# Patient Record
Sex: MALE | Race: BLACK OR AFRICAN AMERICAN | NOT HISPANIC OR LATINO | URBAN - METROPOLITAN AREA
[De-identification: names, ages, dates, MRNs, and addresses within clinical notes are randomized per-mention and may not be internally consistent; named-entity substitution may affect disease eponyms.]

---

## 2017-08-29 ENCOUNTER — EMERGENCY (EMERGENCY)
Facility: HOSPITAL | Age: 32
LOS: 1 days | Discharge: HOME | End: 2017-08-29
Attending: EMERGENCY MEDICINE | Admitting: EMERGENCY MEDICINE
Payer: MEDICAID

## 2017-08-29 VITALS
TEMPERATURE: 98 F | DIASTOLIC BLOOD PRESSURE: 99 MMHG | HEIGHT: 68 IN | OXYGEN SATURATION: 98 % | HEART RATE: 79 BPM | RESPIRATION RATE: 16 BRPM | SYSTOLIC BLOOD PRESSURE: 141 MMHG | WEIGHT: 136.91 LBS

## 2017-08-29 DIAGNOSIS — A63.0 ANOGENITAL (VENEREAL) WARTS: ICD-10-CM

## 2017-08-29 DIAGNOSIS — L02.213 CUTANEOUS ABSCESS OF CHEST WALL: ICD-10-CM

## 2017-08-29 PROCEDURE — 10061 I&D ABSCESS COMP/MULTIPLE: CPT

## 2017-08-29 PROCEDURE — 99284 EMERGENCY DEPT VISIT MOD MDM: CPT | Mod: 25

## 2017-08-29 NOTE — ED PROVIDER NOTE - OBJECTIVE STATEMENT
32 yo M w/ no pertinent PMHx presents with painful abscess to sternum x 2 days. Pt notes itching to area that developed into a small boil. no bleeding, no chills, no fever, no purulent drainage, no red streaks. 32 yo M w/ history of HIV presents with painful abscess to sternum x 2 days. Pt notes itching to area that developed into a small boil. no bleeding, no chills, no fever, no purulent drainage, no red streaks. 32 yo M w/ history of HIV presents with painful abscess to lower sternum x 2 days. Pt notes itching to area that developed into a small boil. no bleeding, no chills, no fever, no purulent drainage, no red streaks.    Additionally pt requests rectal exam because he notes a "white bump" to the 12 o'clock position that has been there for "over a year."  Pt is sexually active with male partners, anal receptive.

## 2017-08-29 NOTE — ED ADULT TRIAGE NOTE - CHIEF COMPLAINT QUOTE
tearful and worried c/o 1.5 cm abscess to sternum. +redness no fever no red streaks, noticed 2days PTA.

## 2017-08-29 NOTE — ED ADULT NURSE NOTE - OBJECTIVE STATEMENT
Patient to ED with complaint of pain (possible abscess) to sternum.  Patient denies fever.  No acute distress

## 2017-08-29 NOTE — ED PROVIDER NOTE - MEDICAL DECISION MAKING DETAILS
pt with fluctuant tender abscess on exam, s/p I&D at bedside, packed with sterile packing strips and dressed with DSD, wound care instructions provided, instructed to return in 2d for wound check or sooner for any s/s of worsening infxn. strict return precautions discussed, pt verbalized understanding.

## 2017-08-29 NOTE — ED PROVIDER NOTE - SKIN, MLM
1.5 cm abscess to sternum, no surrounding erythema or swelling, no purulent drainage. 1.5 cm abscess to sternum, no surrounding erythema or cellulitis.  +Purulent head, no drainage.

## 2017-08-29 NOTE — ED PROVIDER NOTE - DIAGNOSIS COUNSELING, MDM
I had a detailed discussion with the patient regarding the historical points, exam findings, and any diagnostic results supporting the discharge diagnosis. conducted a detailed discussion...

## 2019-01-07 ENCOUNTER — EMERGENCY (EMERGENCY)
Facility: HOSPITAL | Age: 34
LOS: 1 days | Discharge: HOME | End: 2019-01-07
Attending: EMERGENCY MEDICINE | Admitting: EMERGENCY MEDICINE
Payer: MEDICAID

## 2019-01-07 VITALS
OXYGEN SATURATION: 99 % | TEMPERATURE: 98 F | RESPIRATION RATE: 16 BRPM | WEIGHT: 139.99 LBS | DIASTOLIC BLOOD PRESSURE: 86 MMHG | SYSTOLIC BLOOD PRESSURE: 121 MMHG | HEART RATE: 95 BPM

## 2019-01-07 DIAGNOSIS — L02.414 CUTANEOUS ABSCESS OF LEFT UPPER LIMB: ICD-10-CM

## 2019-01-07 DIAGNOSIS — L02.415 CUTANEOUS ABSCESS OF RIGHT LOWER LIMB: ICD-10-CM

## 2019-01-07 PROCEDURE — 99283 EMERGENCY DEPT VISIT LOW MDM: CPT | Mod: 25

## 2019-01-07 PROCEDURE — 10061 I&D ABSCESS COMP/MULTIPLE: CPT

## 2019-01-07 RX ORDER — IBUPROFEN 200 MG
600 TABLET ORAL ONCE
Qty: 0 | Refills: 0 | Status: COMPLETED | OUTPATIENT
Start: 2019-01-07 | End: 2019-01-07

## 2019-01-07 RX ORDER — AZTREONAM 2 G
1 VIAL (EA) INJECTION
Qty: 20 | Refills: 0 | OUTPATIENT
Start: 2019-01-07 | End: 2019-01-16

## 2019-01-07 RX ADMIN — Medication 1 TABLET(S): at 15:06

## 2019-01-07 RX ADMIN — Medication 600 MILLIGRAM(S): at 15:10

## 2019-01-07 NOTE — ED PROVIDER NOTE - OBJECTIVE STATEMENT
33 y.o M with no known PMHx presents to the ED with c/o 2 abscesses with associated redness on right and left upper thigh since Saturday. Admits to having a hx of abscess. Pt denies shaving area. denies fever, chills, n/v/d. Reports allergy to Clindamycin.

## 2019-01-07 NOTE — ED ADULT NURSE NOTE - CHPI ED NUR SYMPTOMS NEG
no purulent drainage/no rectal pain/no bleeding at site/no blood in mucus/no vomiting/no drainage/no chills/no fever

## 2019-01-07 NOTE — ED ADULT NURSE NOTE - NSIMPLEMENTINTERV_GEN_ALL_ED
Implemented All Universal Safety Interventions:  [place] to call system. Call bell, personal items and telephone within reach. Instruct patient to call for assistance. Room bathroom lighting operational. Non-slip footwear when patient is off stretcher. Physically safe environment: no spills, clutter or unnecessary equipment. Stretcher in lowest position, wheels locked, appropriate side rails in place.

## 2019-01-07 NOTE — ED PROVIDER NOTE - SKIN, MLM
2cm abscess with superficial area of erythema on right upper thigh. 1cm abscess with superficial area of erythema on left upper thigh.

## 2019-01-10 ENCOUNTER — EMERGENCY (EMERGENCY)
Facility: HOSPITAL | Age: 34
LOS: 1 days | Discharge: HOME | End: 2019-01-10
Attending: EMERGENCY MEDICINE | Admitting: EMERGENCY MEDICINE
Payer: MEDICAID

## 2019-01-10 VITALS
SYSTOLIC BLOOD PRESSURE: 132 MMHG | DIASTOLIC BLOOD PRESSURE: 87 MMHG | HEART RATE: 83 BPM | RESPIRATION RATE: 17 BRPM | TEMPERATURE: 98 F | OXYGEN SATURATION: 99 %

## 2019-01-10 PROBLEM — L02.91 CUTANEOUS ABSCESS, UNSPECIFIED: Chronic | Status: ACTIVE | Noted: 2019-01-07

## 2019-01-10 PROCEDURE — 99282 EMERGENCY DEPT VISIT SF MDM: CPT

## 2019-01-10 NOTE — ED PROVIDER NOTE - PHYSICAL EXAMINATION
*GEN - NAD; well appearing; A+O x3   *HEAD - NC/AT   *EYES/NOSE - PERRL & EOMI b/l  *THROAT: airway patent, moist mucus membranes  *NECK: Neck supple, no masses  *PULMONARY - CTA b/l, symmetric breath sounds.   *CARDIAC -s1s2, RRR, no Murmur  *ABDOMEN -  ND, NT, soft, no guarding, no rebound, no masses   *BACK - no CVA tenderness, Normal  spine   *EXTREMITIES - symmetric pulses, 2+ dp & radial, capillary refill < 2 seconds, no cyanosis, no edema   *SKIN - b/l thigh abscess. R thigh abscess with packing mild ttp no draining pus, erythema resolving & within encircled area. L thigh abscess with no pus   *NEUROLOGIC - alert, CN 2-12 intact, moves all 4 extremeties, normal gait  *PSYCH - insight and judgment nl, memory nl, affect nl, thought nl

## 2019-01-10 NOTE — ED PROVIDER NOTE - NSFOLLOWUPINSTRUCTIONS_ED_ALL_ED_FT
follow up with pmd within 1-2days, call to make appointment  come back to ED if you develop fever greater than 100.4F, chest pain, worsening pain, vomitting, worsening of redness around abscess outside of encircled area  take tylenol 650 mg every 6 hours as needed for pain  take motrin 600mg every 6 hours as needed for pain follow up with pmd within 1-2days, call to make appointment  come back to ED if you develop fever greater than 100.4F, chest pain, worsening pain, vomiting, worsening of redness around abscess outside of encircled area  take tylenol 650 mg every 6 hours as needed for pain  take motrin 600mg every 6 hours as needed for pain

## 2019-01-10 NOTE — ED PROVIDER NOTE - OBJECTIVE STATEMENT
Pertinent PMH/PSH/FHx/SHx and Review of Systems contained within  32yo M PMH HIV on haart (cd4 > 800, undetectable viral load) 2d s/p I&D b/l thigh abscess presents for wound check. L thigh abscess with needle draininge. R thigh abscess with i&D & packing. taking bactrim. pain resolved. no increasing erythema at abscess site. saw ID doc  ROS negative for: fever, Chest pain, SOB, Nausea, vomiting, diarrhea, abdominal pain,   Fh and Sh not otherwise contributory

## 2019-01-10 NOTE — ED PROVIDER NOTE - NS ED ROS FT
Review of Systems:  	•	CONSTITUTIONAL - no fever  	•	SKIN - + abscess  	•	RESPIRATORY - no shortness of breath  	•	CARDIAC - no chest pain, no palpitations  	•	GI - no abd pain, no nausea, no vomiting, no diarrhea  	•	GENITO-URINARY -  no dysuria;   	•	MUSCULOSKELETAL - no back pain  	•	NEUROLOGIC - no weakness  	•	ALLERGY - no rhinitis  	•	PSYSCHIATRIC - no anxiety

## 2019-01-10 NOTE — ED PROVIDER NOTE - MEDICAL DECISION MAKING DETAILS
R thigh abscess with packing mild ttp no draining pus, erythema resolving & within encircled area. L thigh abscess with no pus. will dc w/ return precaution & pmd f/u R thigh abscess with packing mild ttp no draining pus, erythema resolving & within encircled area. L thigh abscess with no pus. will dc w/ return precaution & pmd f/u  -Pt requests new PCP that specializes in [facility].

## 2019-01-10 NOTE — ED PROVIDER NOTE - ATTENDING CONTRIBUTION TO CARE
Pt is a 32yo M with a h/o HIV and had recent visit for b/l anterior thigh abscesses.  Pt had I&D on R and needle aspiration on L.  Reports sxs and redness and swelling all improving.  No Fever or chills. Followed with his ID doctor who started him on chlorhexidine.    Will continue the Bactrim he is already on.   ROS - otherwise negative.   PE - agree with resident exam as above and wounds appear to be healing.  R wound open.   A/P - Wound check.  Improving.  Will continue bactrim, warm compresses, and chlorhexidine.  Requested new PCP - will refer to Dr. Teixeira.

## 2019-01-14 DIAGNOSIS — Z48.01 ENCOUNTER FOR CHANGE OR REMOVAL OF SURGICAL WOUND DRESSING: ICD-10-CM

## 2019-01-14 DIAGNOSIS — Z79.2 LONG TERM (CURRENT) USE OF ANTIBIOTICS: ICD-10-CM

## 2020-06-28 NOTE — ED PROCEDURE NOTE - PROCEDURE NAME, MLM
Incision & Drainage Wt Readings from Last 3 Encounters:   06/28/20 97 5 kg (214 lb 15 2 oz)   06/25/20 83 8 kg (184 lb 11 9 oz)   06/19/20 83 5 kg (184 lb)     Currently patient is euvolemic  Cardiology on board  Lasix on hold

## 2023-12-12 NOTE — ED PROCEDURE NOTE - EBL
EP LVM 12/12 to sched a new ID visit- 60 minutes with any general ID provider. Not urgent (nurses reviewed), referral from Dr. Jose MD.    minimal
